# Patient Record
Sex: MALE | Race: WHITE | NOT HISPANIC OR LATINO | Employment: OTHER | URBAN - METROPOLITAN AREA
[De-identification: names, ages, dates, MRNs, and addresses within clinical notes are randomized per-mention and may not be internally consistent; named-entity substitution may affect disease eponyms.]

---

## 2017-01-27 ENCOUNTER — RECORDS - HEALTHEAST (OUTPATIENT)
Dept: GENERAL RADIOLOGY | Facility: CLINIC | Age: 72
End: 2017-01-27

## 2017-01-27 ENCOUNTER — OFFICE VISIT - HEALTHEAST (OUTPATIENT)
Dept: FAMILY MEDICINE | Facility: CLINIC | Age: 72
End: 2017-01-27

## 2017-01-27 ENCOUNTER — COMMUNICATION - HEALTHEAST (OUTPATIENT)
Dept: FAMILY MEDICINE | Facility: CLINIC | Age: 72
End: 2017-01-27

## 2017-01-27 DIAGNOSIS — I10 ESSENTIAL HYPERTENSION WITH GOAL BLOOD PRESSURE LESS THAN 140/90: ICD-10-CM
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
## 2017-01-27 DIAGNOSIS — M54.2 NECK PAIN: ICD-10-CM

## 2017-01-27 DIAGNOSIS — M89.8X1 OTHER SPECIFIED DISORDERS OF BONE, SHOULDER: ICD-10-CM

## 2017-01-27 DIAGNOSIS — M89.8X1 PAIN OF RIGHT CLAVICLE: ICD-10-CM

## 2017-01-27 RX ORDER — CYCLOBENZAPRINE HCL 5 MG
TABLET ORAL
Qty: 20 TABLET | Refills: 0 | Status: SHIPPED | OUTPATIENT
Start: 2017-01-27 | End: 2022-02-08

## 2017-01-27 ASSESSMENT — MIFFLIN-ST. JEOR: SCORE: 1732.93

## 2017-01-31 ENCOUNTER — COMMUNICATION - HEALTHEAST (OUTPATIENT)
Dept: FAMILY MEDICINE | Facility: CLINIC | Age: 72
End: 2017-01-31

## 2017-02-01 ENCOUNTER — COMMUNICATION - HEALTHEAST (OUTPATIENT)
Dept: FAMILY MEDICINE | Facility: CLINIC | Age: 72
End: 2017-02-01

## 2017-02-05 ENCOUNTER — RECORDS - HEALTHEAST (OUTPATIENT)
Dept: ADMINISTRATIVE | Facility: OTHER | Age: 72
End: 2017-02-05

## 2017-02-22 ENCOUNTER — COMMUNICATION - HEALTHEAST (OUTPATIENT)
Dept: FAMILY MEDICINE | Facility: CLINIC | Age: 72
End: 2017-02-22

## 2017-09-29 ENCOUNTER — COMMUNICATION - HEALTHEAST (OUTPATIENT)
Dept: FAMILY MEDICINE | Facility: CLINIC | Age: 72
End: 2017-09-29

## 2017-10-02 ENCOUNTER — AMBULATORY - HEALTHEAST (OUTPATIENT)
Dept: NURSING | Facility: CLINIC | Age: 72
End: 2017-10-02

## 2017-10-02 DIAGNOSIS — Z23 NEED FOR IMMUNIZATION AGAINST INFLUENZA: ICD-10-CM

## 2017-10-30 ENCOUNTER — OFFICE VISIT - HEALTHEAST (OUTPATIENT)
Dept: FAMILY MEDICINE | Facility: CLINIC | Age: 72
End: 2017-10-30

## 2017-10-30 DIAGNOSIS — H35.61 RETINAL HEMORRHAGE OF RIGHT EYE: ICD-10-CM

## 2017-10-30 DIAGNOSIS — K13.70 ORAL LESION: ICD-10-CM

## 2017-10-30 LAB
CHOLEST SERPL-MCNC: 245 MG/DL
FASTING STATUS PATIENT QL REPORTED: YES
HBA1C MFR BLD: 5.7 % (ref 3.5–6)
HDLC SERPL-MCNC: 39 MG/DL
LDLC SERPL CALC-MCNC: 147 MG/DL
TRIGL SERPL-MCNC: 295 MG/DL

## 2017-10-30 ASSESSMENT — MIFFLIN-ST. JEOR: SCORE: 1757.52

## 2017-11-11 ENCOUNTER — COMMUNICATION - HEALTHEAST (OUTPATIENT)
Dept: FAMILY MEDICINE | Facility: CLINIC | Age: 72
End: 2017-11-11

## 2017-11-17 ENCOUNTER — RECORDS - HEALTHEAST (OUTPATIENT)
Dept: ADMINISTRATIVE | Facility: OTHER | Age: 72
End: 2017-11-17

## 2017-12-05 ENCOUNTER — OFFICE VISIT - HEALTHEAST (OUTPATIENT)
Dept: OTOLARYNGOLOGY | Facility: CLINIC | Age: 72
End: 2017-12-05

## 2017-12-05 DIAGNOSIS — D10.30: ICD-10-CM

## 2017-12-05 RX ORDER — ASCORBIC ACID 500 MG
500 TABLET ORAL DAILY
Status: SHIPPED | COMMUNITY
Start: 2017-12-05 | End: 2022-02-08

## 2017-12-05 RX ORDER — MULTIPLE VITAMINS W/ MINERALS TAB 9MG-400MCG
1 TAB ORAL DAILY
Status: SHIPPED | COMMUNITY
Start: 2017-12-05 | End: 2022-02-08

## 2017-12-05 RX ORDER — VITAMIN E 268 MG
400 CAPSULE ORAL DAILY
Status: SHIPPED | COMMUNITY
Start: 2017-12-05 | End: 2022-02-08

## 2017-12-07 LAB
LAB AP CHARGES (HE HISTORICAL CONVERSION): NORMAL
PATH REPORT.COMMENTS IMP SPEC: NORMAL
PATH REPORT.FINAL DX SPEC: NORMAL
PATH REPORT.GROSS SPEC: NORMAL
PATH REPORT.MICROSCOPIC SPEC OTHER STN: NORMAL
PATH REPORT.RELEVANT HX SPEC: NORMAL
RESULT FLAG (HE HISTORICAL CONVERSION): NORMAL

## 2018-01-17 ENCOUNTER — COMMUNICATION - HEALTHEAST (OUTPATIENT)
Dept: OTOLARYNGOLOGY | Facility: CLINIC | Age: 73
End: 2018-01-17

## 2018-08-24 ENCOUNTER — RECORDS - HEALTHEAST (OUTPATIENT)
Dept: ADMINISTRATIVE | Facility: OTHER | Age: 73
End: 2018-08-24

## 2018-10-19 ENCOUNTER — AMBULATORY - HEALTHEAST (OUTPATIENT)
Dept: NURSING | Facility: CLINIC | Age: 73
End: 2018-10-19

## 2018-10-19 DIAGNOSIS — Z23 FLU VACCINE NEED: ICD-10-CM

## 2018-11-27 ENCOUNTER — COMMUNICATION - HEALTHEAST (OUTPATIENT)
Dept: FAMILY MEDICINE | Facility: CLINIC | Age: 73
End: 2018-11-27

## 2018-12-21 ENCOUNTER — COMMUNICATION - HEALTHEAST (OUTPATIENT)
Dept: FAMILY MEDICINE | Facility: CLINIC | Age: 73
End: 2018-12-21

## 2019-05-20 ENCOUNTER — OFFICE VISIT - HEALTHEAST (OUTPATIENT)
Dept: FAMILY MEDICINE | Facility: CLINIC | Age: 74
End: 2019-05-20

## 2019-05-20 DIAGNOSIS — Z23 NEED FOR VACCINATION: ICD-10-CM

## 2019-05-20 DIAGNOSIS — R55 SYNCOPE AND COLLAPSE: ICD-10-CM

## 2019-05-20 ASSESSMENT — MIFFLIN-ST. JEOR: SCORE: 1722.36

## 2019-11-05 ENCOUNTER — AMBULATORY - HEALTHEAST (OUTPATIENT)
Dept: NURSING | Facility: CLINIC | Age: 74
End: 2019-11-05

## 2019-11-05 DIAGNOSIS — Z23 FLU VACCINE NEED: ICD-10-CM

## 2020-02-27 ENCOUNTER — COMMUNICATION - HEALTHEAST (OUTPATIENT)
Dept: FAMILY MEDICINE | Facility: CLINIC | Age: 75
End: 2020-02-27

## 2020-06-03 ENCOUNTER — COMMUNICATION - HEALTHEAST (OUTPATIENT)
Dept: FAMILY MEDICINE | Facility: CLINIC | Age: 75
End: 2020-06-03

## 2020-06-17 ENCOUNTER — COMMUNICATION - HEALTHEAST (OUTPATIENT)
Dept: FAMILY MEDICINE | Facility: CLINIC | Age: 75
End: 2020-06-17

## 2021-04-21 ENCOUNTER — AMBULATORY - HEALTHEAST (OUTPATIENT)
Dept: FAMILY MEDICINE | Facility: CLINIC | Age: 76
End: 2021-04-21

## 2021-04-21 ENCOUNTER — COMMUNICATION - HEALTHEAST (OUTPATIENT)
Dept: FAMILY MEDICINE | Facility: CLINIC | Age: 76
End: 2021-04-21

## 2021-04-21 DIAGNOSIS — R05.9 COUGH: ICD-10-CM

## 2021-04-29 ENCOUNTER — AMBULATORY - HEALTHEAST (OUTPATIENT)
Dept: FAMILY MEDICINE | Facility: CLINIC | Age: 76
End: 2021-04-29

## 2021-04-29 DIAGNOSIS — R05.9 COUGH: ICD-10-CM

## 2021-05-01 ENCOUNTER — COMMUNICATION - HEALTHEAST (OUTPATIENT)
Dept: SCHEDULING | Facility: CLINIC | Age: 76
End: 2021-05-01

## 2021-05-29 NOTE — PROGRESS NOTES
Assessment: /    Plan:    1. Fainting (Syncope)     2. Need for vaccination  Pneumococcal conjugate vaccine 13-valent 6wks-17yrs; >50yrs       He will check on where [employer] is covered.    He declines colonoscopy.    I completed form for  and vehicle services and faxed it.      Subjective:    HPI:  Tj Peralta is a 73-year-old male presenting for follow-up on syncope.  He needs a form completed for the [employer].  He had syncope on 2/23/2003.  He has had no episode since then.       Review of Systems: No fever, cough, chest pain, dizziness.      Current Outpatient Medications   Medication Sig Dispense Refill     ascorbic acid, vitamin C, (ASCORBIC ACID WITH ALTA HIPS) 500 MG tablet Take 500 mg by mouth daily.       cetirizine-pseudoephedrine (ZYRTEC-D) 5-120 mg per tablet Take 1 tablet by mouth daily.       cyclobenzaprine (FLEXERIL) 5 MG tablet 1-2 tablets at bedtime as needed 20 tablet 0     guaiFENesin (MUCINEX) 600 mg tp12 12 hr tablet Take capsule PRN       IBUPROFEN (ADVIL ORAL) 200 mg. Take 3 capsules twice daily PRN       multivitamin with minerals (THERA-M) 9 mg iron-400 mcg Tab tablet Take 1 tablet by mouth daily.       vitamin E 400 unit capsule Take 400 Units by mouth daily.       No current facility-administered medications for this visit.          Objective:    Vitals:    05/20/19 1640   BP: 120/68   Pulse: 86   Resp: 20   Temp: 97.6  F (36.4  C)   SpO2: 96%       Gen:  NAD, VSS  Lungs:  normal  Heart:  normal          ADDITIONAL HISTORY SUMMARIZED (2): None.  DECISION TO OBTAIN EXTRA INFORMATION (1): None.   RADIOLOGY TESTS (1): None.  LABS (1): None.  MEDICINE TESTS (1): None.  INDEPENDENT REVIEW (2 each): None.     Total Data Points: 0

## 2021-05-30 VITALS — WEIGHT: 219.08 LBS | HEIGHT: 71 IN | BODY MASS INDEX: 30.67 KG/M2

## 2021-05-31 VITALS — WEIGHT: 224.5 LBS | BODY MASS INDEX: 31.43 KG/M2 | HEIGHT: 71 IN

## 2021-06-03 VITALS — HEIGHT: 71 IN | BODY MASS INDEX: 30.35 KG/M2 | WEIGHT: 216.75 LBS

## 2021-06-08 NOTE — PROGRESS NOTES
Assessment: /    Plan:    1. Pain of right clavicle  XR Clavicle Right    Ambulatory referral to Orthopedic Surgery   2. Neck pain  cyclobenzaprine (FLEXERIL) 5 MG tablet   3. Fall due to slipping on ice or snow, initial encounter     4. Essential hypertension with goal blood pressure less than 140/90  HM2(CBC w/o Differential)    Comprehensive Metabolic Panel       I gave the patient the clavicle x-rays, and he will schedule orthopedic appointment for further evaluation of possible dislocation.    Patient does not have signs or symptoms of intracranial hemorrhage.  He and his wife will be alert for potential signs.    Call or recheck if any problems.      Subjective:    HPI:  Tj Peralta is a 71-year-old male presenting with neck and shoulder pain.  1/24/17 he went outside to bring the garbage cart up the driveway.  First, he brought the neighbor's garbage cart up.  When he went back down the neighbors driveway, he encountered ice at the bottom, and slipped and fell.  He hit the back of his head.  He then began crawling on his hands and knees back up the neighbor's driveway.  Patient's wife saw him, and assisted him back to their house by driving their car down the driveway and transporting him back up.  Patient is not sure whether he had loss of consciousness.    He states that the medial end of the right clavicle protrudes from the sterno-clavicular joint, and did not previously.    Left posterior cervical neck muscles have been sore.    He has been taking 4 Advil twice daily, as he has done for years.  He also takes Zyrtec.  He states that trazodone has not helped for sleeping.  He states that he did not eat for 24 hours following his injury, because he did not feel well.    Social Hx:  He is accompanied today by his wife, Ny.  He quit smoking in 1968.    Review of Systems:  He denies headache, vision change, nausea, vomiting, tingling or weakness of arms or legs.  He states that he stubbed his left great toe a  "few months ago, and would like this looked at.      Current Outpatient Prescriptions   Medication Sig Dispense Refill     cetirizine-pseudoephedrine (ZYRTEC-D) 5-120 mg per tablet Take 1 tablet by mouth daily.       cyclobenzaprine (FLEXERIL) 5 MG tablet 1-2 tablets at bedtime as needed 20 tablet 0     guaiFENesin (MUCINEX) 600 mg tp12 12 hr tablet Take capsule PRN       IBUPROFEN (ADVIL ORAL) 200 mg. Take 3 capsules twice daily PRN       methocarbamol (ROBAXIN) 500 MG tablet Take 1 tablet (500 mg total) by mouth bedtime as needed. 10 tablet 0     traZODone (DESYREL) 50 MG tablet Take 50 mg by mouth bedtime as needed for sleep.       No current facility-administered medications for this visit.          Objective:    Vitals:    01/27/17 1418 01/27/17 1424   BP: 134/76 126/74   Patient Site: Right Arm Right Arm   Patient Position: Sitting Sitting   Cuff Size: Adult Regular Adult Large   Pulse: 79    Resp: 19    Temp: 97.3  F (36.3  C)    TempSrc: Oral    SpO2: 95%    Weight: 219 lb 1.3 oz (99.4 kg)    Height: 5' 10.5\" (1.791 m)        Gen:  NAD, VSS  Head with no bony abnormality.  Slight abrasion on the occiput.  Eyes: EOM full, pupils normal  Ears normal  Neck with tenderness of the left posterior cervical muscles.  No midline spinal tenderness.  Full range of motion.  Right clavicle with prominence at the sterno-clavicular joint.  Shoulders without bony abnormality.  Lungs:  normal  Heart:  normal  Left great toe with small subungual hematoma at the mid and distal portion.  No bony abnormality.    X-ray of the clavicle demonstrates no fracture.        ADDITIONAL HISTORY SUMMARIZED (2): None.  DECISION TO OBTAIN EXTRA INFORMATION (1): None.   RADIOLOGY TESTS (1): Right clavicle x-ray.  LABS (1): Ordered.  MEDICINE TESTS (1): None.  INDEPENDENT REVIEW (2 each): I personally reviewed today's clavicle x-rays.     Total Data Points: 4    "

## 2021-06-08 NOTE — TELEPHONE ENCOUNTER
Who is calling:  Patient   Reason for Call:  Patient got a letter telling him to schedule preventive care exam. Patient will call closer to fall to schedule.  He does not have another clinic, and has not switched providers, he just has a lot of things going on this summer and will call towards fall  Date of last appointment with primary care: n/a  Okay to leave a detailed message: Yes

## 2021-06-14 NOTE — PROGRESS NOTES
HISTORY OF PRESENT ILLNESS  Patient reports that he has a lump in the right side of the lower lip.  He reports that he bites his lip in his sleep.  Every time that he eats it seems to get larger.  Not much in the way of pain.  No fever, sweats of chills.      REVIEW OF SYSTEMS  Review of Systems: a 10-system review was performed. Pertinent positives are noted in the HPI and on a separate scanned document in the chart.    PMH, PSH, FH and SH has documented in the EHR.      EXAM    CONSTITUTIONAL  General Appearance:  Normal, well developed, well nourished, no obvious distress  Ability to Communicate:  communicates appropriately.    HEAD AND FACE  Appearance and Symmetry:  Normal, no scalp or facial scarring or suspicious lesions.  Paranasal sinuses tenderness:  Normal, Paranasal sinuses non tender    EARS  Clinical speech reception threshold:  Normal, able to hear normal speech.  Auricle:  Normal, Auricles without scars, lesions, masses.  External auditory canal:  Normal, External auditory canal normal.  Tympanic membrane:  Normal, Tympanic membranes normal without swelling or erythema.  Tympanic membrane mobility:  Normal, Normal tympanic membrane mobility.    NOSE (speculum or scope)  Architecture:  Normal, Grossly normal external nasal architecture with no masses or lesions.  Mucosa:  Normal mucosa, No polyps or masses.  Septum:  Normal, Septum non-obstructing.  Turbinates:  Normal, No turbinate abnormalities    ORAL CAVITY AND OROPHARYNX  Lips:  1cm fibroma of the mucosa of the right lower lip.  Dental and gingiva:  Normal, No obvious dental or gingival disease.  Mucosa:  Normal, Moist mucous membranes.  Tongue:  Normal, Tongue mobile with no mucosal abnormalities  Hard and soft palate:  Normal, Hard and soft palate without cleft or mucosal lesions.  Oral pharynx:  Normal, Posterior pharynx without lesions or remarkable asymmetry.  Saliva:  Normal, Clear saliva.  Masses:  Normal, No palpable masses or  pathologically enlarged lymph nodes.    NECK  Masses/lymph nodes:  Normal, No worrisome neck masses or lymph nodes.  Salivary glands:  Normal, Parotid and submandibular glands.  Trachea and larynx position:  Normal, Trachea and larynx midline.  Thyroid:  Normal, No thyroid abnormality.  Tenderness:  Normal, No cervical tenderness.  Suppleness:  Normal, Neck supple    NEUROLOGICAL  Speech pattern:  Normal, Proasaic    RESPIRATORY  Symmetry and Respiratory effort:  Normal, Symmetric chest movement and expansion with no increased intercostal retractions or use of accessory muscles.     IMPRESSION  Fibroma of the lower lip    RECOMMENDATION  I discussed removal and the patient requested this be done.  The area was infiltrated with lidocaine plus epinephrine solution.  After allowing adequate time for anesthesia the lesion was removed with a forceps and sharp scissors.  Patient tolerated the procedure without incident.    Saw Younger MD

## 2021-06-14 NOTE — PROGRESS NOTES
Assessment: /    Plan:    1. Retinal hemorrhage of right eye  Lipid Gage FASTING    INR    Glycosylated Hemoglobin A1c    APTT(PTT)   2. Oral lesion  Ambulatory referral to Otolaryngology       Patient will schedule ENT appointment.  We will fax lab results to Dr. Lennon.  Further follow-up depending upon test results.      Subjective:    HPI:  Tj Peralta is a 72-year-old male returning for lab work, and also evaluation of a lesion inside the right lower lip.  He went to eye doctor, Dr. Khoi Lennon, and was diagnosed with a retinal hemorrhage.  He requested that the patient see me to have cholesterol and other labs checked.  He had normal hemogram and CMP in January.    He also notes a swollen area inside the right lower lip for several weeks.  He does not recall any trauma to the area.      Review of Systems: No fever, cough, polyuria.      Current Outpatient Prescriptions   Medication Sig Dispense Refill     cetirizine-pseudoephedrine (ZYRTEC-D) 5-120 mg per tablet Take 1 tablet by mouth daily.       IBUPROFEN (ADVIL ORAL) 200 mg. Take 3 capsules twice daily PRN       cyclobenzaprine (FLEXERIL) 5 MG tablet 1-2 tablets at bedtime as needed 20 tablet 0     guaiFENesin (MUCINEX) 600 mg tp12 12 hr tablet Take capsule PRN       traZODone (DESYREL) 50 MG tablet Take 50 mg by mouth bedtime as needed for sleep.       No current facility-administered medications for this visit.          Objective:    Vitals:    10/30/17 1355   BP: 128/76   Pulse: 95   Resp: 19   Temp: 98.5  F (36.9  C)   SpO2: 94%       Gen:  NAD, VSS  Eyes without conjunctival pallor  Oropharynx with 4 mm x 8 mm swelling inside the right lower lip.  This is possibly a mucocele.  Neck supple without adenopathy or thyromegaly  Lungs:  normal  Heart:  normal          ADDITIONAL HISTORY SUMMARIZED (2): None.  DECISION TO OBTAIN EXTRA INFORMATION (1): None.   RADIOLOGY TESTS (1): None.  LABS (1): Ordered.  MEDICINE TESTS (1): None.  INDEPENDENT REVIEW (2  each): None.     Total Data Points: 1

## 2021-06-16 NOTE — TELEPHONE ENCOUNTER
CMT reviewed Saran' spouse chart (Sunshine Guilloryo,  1952) and her orders for COVID testing are already in her chart.     Will MD place orders for Saran to have the tests too?

## 2021-06-20 NOTE — LETTER
Letter by Abelardo Kwok MD at      Author: Abelardo Kwok MD Service: -- Author Type: --    Filed:  Encounter Date: 6/3/2020 Status: (Other)         Saran Peralta  [address] [place] [address]                     Marisela 3, 2020    Dear Saran:    At the [facility], we care about you and your health. When diagnosed early, many diseases and illness can be treated and possibly prevented. That's why it is important to see your primary care provider regularly for routine examinations and to maintain your overall health.We are trying to contact you to ensure you receive the best level of care. Our records show that you are overdue for Annual Wellness Visit.  Please contact our office at [phone] to schedule an appointment.  If you are receiving care elsewhere, please contact us so that we can update our records.   Thank you for trusting us with your care.     If you have any questions or concerns, please don't hesitate to call.    Sincerely,        Electronically signed by Abelardo Kwok MD

## 2021-06-22 ENCOUNTER — AMBULATORY - HEALTHEAST (OUTPATIENT)
Dept: NURSING | Facility: CLINIC | Age: 76
End: 2021-06-22

## 2021-07-13 ENCOUNTER — IMMUNIZATION (OUTPATIENT)
Dept: NURSING | Facility: CLINIC | Age: 76
End: 2021-07-13
Payer: COMMERCIAL

## 2021-07-13 PROCEDURE — 91300 PR COVID VAC PFIZER DIL RECON 30 MCG/0.3 ML IM: CPT

## 2021-07-13 PROCEDURE — 0002A PR COVID VAC PFIZER DIL RECON 30 MCG/0.3 ML IM: CPT

## 2022-02-01 ENCOUNTER — OFFICE VISIT (OUTPATIENT)
Dept: FAMILY MEDICINE | Facility: CLINIC | Age: 77
End: 2022-02-01
Payer: COMMERCIAL

## 2022-02-01 VITALS
DIASTOLIC BLOOD PRESSURE: 64 MMHG | OXYGEN SATURATION: 97 % | WEIGHT: 200.25 LBS | SYSTOLIC BLOOD PRESSURE: 138 MMHG | BODY MASS INDEX: 28.33 KG/M2 | HEART RATE: 66 BPM | TEMPERATURE: 97.7 F

## 2022-02-01 DIAGNOSIS — R41.3 MEMORY LOSS: ICD-10-CM

## 2022-02-01 DIAGNOSIS — R73.09 ELEVATED GLUCOSE: ICD-10-CM

## 2022-02-01 DIAGNOSIS — M79.671 PAIN IN BOTH FEET: ICD-10-CM

## 2022-02-01 DIAGNOSIS — R60.0 BILATERAL LEG EDEMA: Primary | ICD-10-CM

## 2022-02-01 DIAGNOSIS — Z23 HIGH PRIORITY FOR 2019 NOVEL CORONAVIRUS VACCINATION: ICD-10-CM

## 2022-02-01 DIAGNOSIS — M79.672 PAIN IN BOTH FEET: ICD-10-CM

## 2022-02-01 LAB
ALBUMIN SERPL-MCNC: 3.2 G/DL (ref 3.5–5)
ALP SERPL-CCNC: 76 U/L (ref 45–120)
ALT SERPL W P-5'-P-CCNC: 17 U/L (ref 0–45)
ANION GAP SERPL CALCULATED.3IONS-SCNC: 10 MMOL/L (ref 5–18)
AST SERPL W P-5'-P-CCNC: 17 U/L (ref 0–40)
BILIRUB SERPL-MCNC: 0.4 MG/DL (ref 0–1)
BUN SERPL-MCNC: 22 MG/DL (ref 8–28)
CALCIUM SERPL-MCNC: 9 MG/DL (ref 8.5–10.5)
CHLORIDE BLD-SCNC: 104 MMOL/L (ref 98–107)
CO2 SERPL-SCNC: 25 MMOL/L (ref 22–31)
CREAT SERPL-MCNC: 0.76 MG/DL (ref 0.7–1.3)
GFR SERPL CREATININE-BSD FRML MDRD: >90 ML/MIN/1.73M2
GLUCOSE BLD-MCNC: 89 MG/DL (ref 70–125)
HBA1C MFR BLD: 5.5 % (ref 0–5.6)
POTASSIUM BLD-SCNC: 4.6 MMOL/L (ref 3.5–5)
PROT SERPL-MCNC: 6.8 G/DL (ref 6–8)
SODIUM SERPL-SCNC: 139 MMOL/L (ref 136–145)
TSH SERPL DL<=0.005 MIU/L-ACNC: 3.35 UIU/ML (ref 0.3–5)
VIT B12 SERPL-MCNC: 331 PG/ML (ref 213–816)

## 2022-02-01 PROCEDURE — 36415 COLL VENOUS BLD VENIPUNCTURE: CPT | Performed by: FAMILY MEDICINE

## 2022-02-01 PROCEDURE — 99215 OFFICE O/P EST HI 40 MIN: CPT | Performed by: FAMILY MEDICINE

## 2022-02-01 PROCEDURE — 83036 HEMOGLOBIN GLYCOSYLATED A1C: CPT | Performed by: FAMILY MEDICINE

## 2022-02-01 PROCEDURE — 84443 ASSAY THYROID STIM HORMONE: CPT | Performed by: FAMILY MEDICINE

## 2022-02-01 PROCEDURE — 91305 COVID-19,PF,PFIZER (12+ YRS): CPT | Performed by: FAMILY MEDICINE

## 2022-02-01 PROCEDURE — 82607 VITAMIN B-12: CPT | Performed by: FAMILY MEDICINE

## 2022-02-01 PROCEDURE — 0054A COVID-19,PF,PFIZER (12+ YRS): CPT | Performed by: FAMILY MEDICINE

## 2022-02-01 PROCEDURE — 80053 COMPREHEN METABOLIC PANEL: CPT | Performed by: FAMILY MEDICINE

## 2022-02-01 RX ORDER — TRAMADOL HYDROCHLORIDE 50 MG/1
50 TABLET ORAL 2 TIMES DAILY PRN
Qty: 15 TABLET | Refills: 0 | Status: SHIPPED | OUTPATIENT
Start: 2022-02-01

## 2022-02-03 ENCOUNTER — TELEPHONE (OUTPATIENT)
Dept: FAMILY MEDICINE | Facility: CLINIC | Age: 77
End: 2022-02-03
Payer: COMMERCIAL

## 2022-02-03 ENCOUNTER — NURSE TRIAGE (OUTPATIENT)
Dept: NURSING | Facility: CLINIC | Age: 77
End: 2022-02-03
Payer: COMMERCIAL

## 2022-02-03 NOTE — TELEPHONE ENCOUNTER
Reason for Call:  Other test results    Detailed comments: Gudelia (daughter, C2C not on file) calls inquiring about what's the next step for patient's edema. Daughter was able to view patient's lab results in [facility] but would like Dr. Kwok to explain the numbers. Please advise.    Phone Number Patient can be reached at:   Home number on file [phone] (home) or Cell number on file:    Telephone Information:   Mobile [phone]       Best Time: anytime    Can we leave a detailed message on this number? YES    Call taken on 2/3/2022 at 4:05 PM by Carmela Ariza

## 2022-02-03 NOTE — TELEPHONE ENCOUNTER
Daughter anna small.  Patient is there.  Today is his wifes .    He has a pain in his lung.  Coughing.  Phlegm.    Heavy in chest pain, heavy breathing. Finger tips and toes numb.  Urinating every 30 minutes.    Will go to ER after . Patient has memory issues and keeps asking who is that and daughter keeps telling him nurse.    If chest pain gets severe will go now.    Janene Chaudhari RN  [facility] Nurse Advisor    Reason for Disposition    Chest pain  (Exception: MILD central chest pain, present only when coughing)    Additional Information    Negative: Severe difficulty breathing (e.g., struggling for each breath, speaks in single words)    Negative: Bluish (or gray) lips or face now    Negative: [1] Difficulty breathing AND [2] exposure to flames, smoke, or fumes    Negative: [1] Stridor AND [2] difficulty breathing    Negative: Sounds like a life-threatening emergency to the triager    Negative: [1] Previous asthma attacks AND [2] this feels like asthma attack    Negative: Dry (non-productive) cough (i.e., no sputum or minimal clear sputum)    Protocols used: COUGH - ACUTE [id number]

## 2022-02-06 ENCOUNTER — HEALTH MAINTENANCE LETTER (OUTPATIENT)
Age: 77
End: 2022-02-06

## 2022-02-09 NOTE — PROGRESS NOTES
Assessment & Plan     Bilateral leg edema    Dependent edema is likely a major component of this.  We are checking labs for other possible causes.    I recommend that they use Ace bandages to wrap the feet and lower legs each morning, and remove them in the evening.  He will keep his legs elevated during the day.  I expect that the edema will gradually decrease.  Hopefully, he will reach a point where compression stockings are possible.    - Comprehensive metabolic panel (BMP + Alb, Alk Phos, ALT, AST, Total. Bili, TP)  - TSH with free T4 reflex  - Comprehensive metabolic panel (BMP + Alb, Alk Phos, ALT, AST, Total. Bili, TP)  - TSH with free T4 reflex    Memory loss    Lara has arranged for caregivers to assist him.    - Vitamin B12  - Vitamin B12    Pain in both feet    Significant pain with walking.  We will temporarily use tramadol to reduce pain.    - traMADol (ULTRAM) 50 MG tablet  Dispense: 15 tablet; Refill: 0    Elevated glucose    He has previously had elevated glucose levels.    - Hemoglobin A1c  - Hemoglobin A1c    High priority for 2019 novel coronavirus vaccination    - COVID-19,PF,PFIZER (12+ Yrs GRAY LABEL)        50 minutes spent on the date of the encounter doing chart review, patient visit and discussion with family regarding leg edema, memory loss, foot pain.           Return in about 3 months (around 5/1/2022) for Follow up.    Abelardo Kwok MD, MD  [facility]    Maco Spencer is a 76 year old who presents for the following health issues     HPI     Tj has been having swelling of both legs.  This has been gradually increasing.  He has not been able to don compression stockings.  It is painful when he walks.  He did not have an injury to the legs.  He had right knee steroid injection on January 29.    His wife passed away January 27.  She had perforated colon.    He is accompanied today by his daughter, Lara.  She lives in [place], [place].  She would like  her father to move into an assisted living facility close to where she lives.  He is in agreement with this.    He has been having increasing difficulty with memory.  He does not use the stove, therefore has never left the burner on.  He uses a microwave.  He has not been leaving the home alone, and therefore has not become lost.    Current Outpatient Medications   Medication Sig Dispense Refill     IBUPROFEN (ADVIL ORAL) [IBUPROFEN (ADVIL ORAL)] 200 mg. Take 3 capsules twice daily PRN       traMADol (ULTRAM) 50 MG tablet Take 1 tablet (50 mg) by mouth 2 times daily as needed for severe pain 15 tablet 0         Review of Systems   No fever or cough.  No chest pain.  He has not had redness or increased warmth of the legs.  He denies orthopnea.      Objective    /64 (BP Location: Left arm, Cuff Size: Adult Regular)   Pulse 66   Temp 97.7  F (36.5  C)   Wt 90.8 kg (200 lb 4 oz)   SpO2 97%   BMI 28.33 kg/m    Body mass index is 28.33 kg/m .  Physical Exam   Heart normal  Lungs normal  Legs with 3+ edema bilateral.  No erythema, or sign of infection.  Homans' sign negative bilateral  Seated in a wheelchair

## 2022-02-13 NOTE — TELEPHONE ENCOUNTER
I left .  Albumin was a little low at 3.2 which can contribute to edema.  I instructed pt to increase protein intake.  We will fax form to [place].  natalio

## 2022-02-15 ENCOUNTER — TELEPHONE (OUTPATIENT)
Dept: FAMILY MEDICINE | Facility: CLINIC | Age: 77
End: 2022-02-15
Payer: COMMERCIAL

## 2022-02-15 ENCOUNTER — MEDICAL CORRESPONDENCE (OUTPATIENT)
Dept: HEALTH INFORMATION MANAGEMENT | Facility: CLINIC | Age: 77
End: 2022-02-15
Payer: COMMERCIAL

## 2022-02-15 NOTE — TELEPHONE ENCOUNTER
Pt is coming in tomorrow for appt for ED/Hosp f/U.  Form given to Lisbeth for tomorrow's appt.  Needs to be completed tomorrow. Thanks.

## 2022-02-15 NOTE — TELEPHONE ENCOUNTER
Reason for Call:  Form, our goal is to have forms completed with 72 hours, however, some forms may require a visit or additional information.    Type of letter, form or note:  PHYSICIAN'S MOVE IN ORDERS    Who is the form from?: [facility] (if other please explain)    Where did the form come from: form was faxed in    What clinic location was the form placed at?: [facility]    Where the form was placed: DR. CEBALLOS'S BLUE Box/Folder    What number is listed as a contact on the form?: [phone]       Additional comments: Shelli from [facility] called to get update of form faxed on 2/10/22, haven't received form back yet. Writer reprinted orders and placed in provider's blue folder. Can assistant follow up with provider for form to be completed?    Call taken on 2/15/2022 at 10:11 AM by Michelle Nolan

## 2022-02-16 ENCOUNTER — OFFICE VISIT (OUTPATIENT)
Dept: FAMILY MEDICINE | Facility: CLINIC | Age: 77
End: 2022-02-16
Payer: COMMERCIAL

## 2022-02-16 VITALS
WEIGHT: 191.75 LBS | SYSTOLIC BLOOD PRESSURE: 116 MMHG | TEMPERATURE: 98 F | OXYGEN SATURATION: 94 % | HEART RATE: 84 BPM | DIASTOLIC BLOOD PRESSURE: 48 MMHG | BODY MASS INDEX: 27.12 KG/M2

## 2022-02-16 DIAGNOSIS — Z11.1 SCREENING EXAMINATION FOR PULMONARY TUBERCULOSIS: ICD-10-CM

## 2022-02-16 DIAGNOSIS — R60.0 BILATERAL LEG EDEMA: Primary | ICD-10-CM

## 2022-02-16 PROCEDURE — 99213 OFFICE O/P EST LOW 20 MIN: CPT | Performed by: FAMILY MEDICINE

## 2022-02-16 PROCEDURE — 86481 TB AG RESPONSE T-CELL SUSP: CPT | Performed by: FAMILY MEDICINE

## 2022-02-16 PROCEDURE — 36415 COLL VENOUS BLD VENIPUNCTURE: CPT | Performed by: FAMILY MEDICINE

## 2022-02-17 LAB
GAMMA INTERFERON BACKGROUND BLD IA-ACNC: 0.03 IU/ML
M TB IFN-G BLD-IMP: NEGATIVE
M TB IFN-G CD4+ BCKGRND COR BLD-ACNC: 0.52 IU/ML
MITOGEN IGNF BCKGRD COR BLD-ACNC: 0 IU/ML
MITOGEN IGNF BCKGRD COR BLD-ACNC: 0.01 IU/ML
QUANTIFERON MITOGEN: 0.55 IU/ML
QUANTIFERON NIL TUBE: 0.03 IU/ML
QUANTIFERON TB1 TUBE: 0.03 IU/ML
QUANTIFERON TB2 TUBE: 0.04

## 2022-02-18 ENCOUNTER — TELEPHONE (OUTPATIENT)
Dept: FAMILY MEDICINE | Facility: CLINIC | Age: 77
End: 2022-02-18
Payer: COMMERCIAL

## 2022-02-18 NOTE — TELEPHONE ENCOUNTER
Dtgera Bermudez called on behalf of pt inquiring about this request. Informed her that PCP is currently working on it. She'd like care team to add pt's TB results with updated forms when they fax it. Thanks.

## 2022-02-18 NOTE — TELEPHONE ENCOUNTER
Form was given to MD and told him form needs to be done today with corrections, will follow up with MD in the PM

## 2022-02-18 NOTE — TELEPHONE ENCOUNTER
Jeni with [employer] at [facility] called stating they need corrections on the form. She emailed TC her needs. Please see her request below:      There are a couple of corrections:  Need to write a statement under general physical condition/review of systems.  Patient ability to self-medicate should be all yes or all no.  Free of communicable disease need to check yes.  Allergies need type of reaction.    Thank you so much for your help with this quick turnaround!  Jeni Gusman [employer] [facility]    [address] [place] [id number]    Direct: [phone]  Ext: [phone]      [employer]          Check out the [employer] Podcast, [email or web address]              Jeni refaxed form. TC printed and will put in PCP's blue folder. Per Jeni, this needs to be completed by this afternoon, please rush. Pt will be moving in on Monday and they cannot let him move in until they get this corrected. Thank you!

## 2022-02-21 NOTE — PROGRESS NOTES
Assessment & Plan     Bilateral leg edema      Screening examination for pulmonary tuberculosis    - Quantiferon TB Gold Plus  - Quantiferon TB Gold Plus    Form for assisted living was amended and faxed.               Return in about 1 year (around 2/16/2023) for Routine preventive.    Abelardo Kwok MD, MD  [facility] ANDREW Spencer is a 76 year old who presents for the following health issues  accompanied by his brother-in-law, Андрей.    HPI     He has been elevating his legs, with some improvement.    His daughter, Lara, will arrive tomorrow, and they will fly to [place] 2/20/22.  He will move into an assisted living facility, [facility], near his daughter.    Review of Systems   No fever or cough.      Objective    /48 (Cuff Size: Adult Regular)   Pulse 84   Temp 98  F (36.7  C) (Oral)   Wt 87 kg (191 lb 12 oz)   SpO2 94%   BMI 27.12 kg/m    Body mass index is 27.12 kg/m .  Physical Exam   Heart normal  Lungs normal  Legs: 2+ edema  Seated in wheelchair

## 2022-05-28 ENCOUNTER — HEALTH MAINTENANCE LETTER (OUTPATIENT)
Age: 77
End: 2022-05-28

## 2022-10-01 ENCOUNTER — HEALTH MAINTENANCE LETTER (OUTPATIENT)
Age: 77
End: 2022-10-01

## 2023-05-14 ENCOUNTER — HEALTH MAINTENANCE LETTER (OUTPATIENT)
Age: 78
End: 2023-05-14

## 2023-10-10 ENCOUNTER — TELEPHONE (OUTPATIENT)
Dept: FAMILY MEDICINE | Facility: CLINIC | Age: 78
End: 2023-10-10
Payer: COMMERCIAL

## 2023-10-10 NOTE — TELEPHONE ENCOUNTER
Patient Quality Outreach    Patient is due for the following:   Physical Annual Wellness Visit      Topic Date Due    Zoster (Shingles) Vaccine (1 of 2) Never done    Diptheria Tetanus Pertussis (DTAP/TDAP/TD) Vaccine (2 - Td or Tdap) 07/31/2022    Flu Vaccine (1) 09/01/2023    COVID-19 Vaccine (4 - 2023-24 season) 09/01/2023       Next Steps:   Schedule a Annual Wellness Visit    Type of outreach:    Phone, left message for patient/parent to call back.    Next Steps:  Reach out within 90 days via Phone.    Max number of attempts reached: No. Will try again in 90 days if patient still on fail list.    Questions for provider review:    None           Joann Banks MA  Chart routed to Care Team.

## 2023-10-25 NOTE — TELEPHONE ENCOUNTER
Patient Quality Outreach    Patient is due for the following:   Physical Annual Wellness Visit      Topic Date Due    Zoster (Shingles) Vaccine (1 of 2) Never done    Diptheria Tetanus Pertussis (DTAP/TDAP/TD) Vaccine (2 - Td or Tdap) 07/31/2022    Flu Vaccine (1) 09/01/2023    COVID-19 Vaccine (4 - 2023-24 season) 09/01/2023       Next Steps:   Schedule a Annual Wellness Visit    Type of outreach:    Phone, left message for patient/parent to call back.    Next Steps:  Reach out within 90 days via Phone.    Max number of attempts reached: Yes. Will try again in 90 days if patient still on fail list.    Questions for provider review:    None           Joann Banks MA  Chart routed to Care Team.

## 2024-07-21 ENCOUNTER — HEALTH MAINTENANCE LETTER (OUTPATIENT)
Age: 79
End: 2024-07-21